# Patient Record
Sex: MALE
[De-identification: names, ages, dates, MRNs, and addresses within clinical notes are randomized per-mention and may not be internally consistent; named-entity substitution may affect disease eponyms.]

---

## 2019-09-13 PROBLEM — Z00.00 ENCOUNTER FOR PREVENTIVE HEALTH EXAMINATION: Status: ACTIVE | Noted: 2019-09-13

## 2019-09-17 ENCOUNTER — TRANSCRIPTION ENCOUNTER (OUTPATIENT)
Age: 24
End: 2019-09-17

## 2019-09-17 ENCOUNTER — APPOINTMENT (OUTPATIENT)
Dept: ORTHOPEDIC SURGERY | Facility: CLINIC | Age: 24
End: 2019-09-17

## 2020-08-15 ENCOUNTER — TRANSCRIPTION ENCOUNTER (OUTPATIENT)
Age: 25
End: 2020-08-15

## 2020-12-05 ENCOUNTER — TRANSCRIPTION ENCOUNTER (OUTPATIENT)
Age: 25
End: 2020-12-05

## 2023-06-07 ENCOUNTER — APPOINTMENT (OUTPATIENT)
Dept: ORTHOPEDIC SURGERY | Facility: CLINIC | Age: 28
End: 2023-06-07
Payer: MEDICAID

## 2023-06-07 VITALS
WEIGHT: 190 LBS | HEART RATE: 70 BPM | SYSTOLIC BLOOD PRESSURE: 109 MMHG | DIASTOLIC BLOOD PRESSURE: 75 MMHG | HEIGHT: 69 IN | BODY MASS INDEX: 28.14 KG/M2

## 2023-06-07 DIAGNOSIS — M17.11 UNILATERAL PRIMARY OSTEOARTHRITIS, RIGHT KNEE: ICD-10-CM

## 2023-06-07 PROCEDURE — 99203 OFFICE O/P NEW LOW 30 MIN: CPT

## 2023-06-07 PROCEDURE — 73562 X-RAY EXAM OF KNEE 3: CPT | Mod: RT

## 2023-06-07 RX ORDER — MELOXICAM 15 MG/1
15 TABLET ORAL
Qty: 30 | Refills: 1 | Status: ACTIVE | COMMUNITY
Start: 2023-06-07 | End: 1900-01-01

## 2023-06-07 NOTE — ADDENDUM
[FreeTextEntry1] : This note was written by Mei Sharma, acting as the  for Dr. Melo. This note accurately reflects the work and decisions made by Dr. Melo.

## 2023-06-07 NOTE — PHYSICAL EXAM
[de-identified] : GENERAL APPEARANCE: Well nourished and hydrated, pleasant, alert, and oriented x 3. Appears their stated age. \par HEENT: Normocephalic, extraocular eye motion intact. Nasal septum midline. Oral cavity clear. External auditory canal clear. \par RESPIRATORY: Breath sounds clear and audible in all lobes. No wheezing, No accessory muscle use.\par CARDIOVASCULAR: No apparent abnormalities. No lower leg edema. No varicosities. Pedal pulses are palpable.\par NEUROLOGIC: Sensation is normal, no muscle weakness in the upper or lower extremities.\par DERMATOLOGIC: No apparent skin lesions, moist, warm, no rash.\par SPINE: Cervical spine appears normal and moves freely; thoracic spine appears normal and moves freely; lumbosacral spine appears normal and moves freely, normal, nontender.\par MUSCULOSKELETAL: Hands, wrists, and elbows are normal and move freely, shoulders are normal and move freely. \par Psychiatric: Oriented to person, place, and time, insight and judgement were intact and the affect was normal. [de-identified] : Right knee exam shows no effusion, ROM is 0-125 degrees, no instability, negative with Lio, no joint line tenderness. Incision is well-healed without erythema drainage or discharge, 5 out of 5 strength for plantarflexion dorsiflexion, sensation intact to light touch over the foot, foot warm well perfused brisk capillary refill. [de-identified] : 4 views of the right knee obtained the office today show no acute fracture or dislocation.  There is medial joint space narrowing osteophyte formation consistent with Kellgren-Zi grade 2 3 changes.  Evidence of prior ACL surgery

## 2023-06-07 NOTE — HISTORY OF PRESENT ILLNESS
[Pain Location] : pain [Worsening] : worsening [] : right knee [de-identified] : 27 year old male patient presents today for an initial consultation for right knee pain. He is not experiencing serious pain when walking and going about his day or exercising. He does have some discomfort in the right knee with swelling. Patient tore his meniscus when playing baseball. Patient wanted to enquire about the gel injections. Patient has a history of surgery on his right knee.  Patient states that he has minimal pain in the knee however he gets recurrent effusions.  Plays softball a few times a week and does have some pain after this.  Denies any instability.  Denies any locking popping or buckling.  Does not currently take any pain medications for this\par \par SURGICAL HISTORY\par 09/2019 - ACL repair and repaired meniscus in right knee\par 08/2020 - Meniscus taken out of right knee

## 2023-06-07 NOTE — DISCUSSION/SUMMARY
[Medication Risks Reviewed] : Medication risks reviewed [Surgical risks reviewed] : Surgical risks reviewed [PRN] : PRN [de-identified] : Patient is a 27-year-old male with right knee posttraumatic osteoarthritis presenting today for initial evaluation.  I thorough discussion with him about conservative treatment options.  I would recommend that he avoid any type of injections at this time as he is minimally symptomatic in his knee.  We had a thorough discussion about low impact activity and exercises.  I recommend meloxicam 15 mg daily as needed for pain.  I will see him back on an as-needed basis for his right knee.  All questions were asked and answered